# Patient Record
Sex: MALE | Race: WHITE | NOT HISPANIC OR LATINO | Employment: FULL TIME | URBAN - METROPOLITAN AREA
[De-identification: names, ages, dates, MRNs, and addresses within clinical notes are randomized per-mention and may not be internally consistent; named-entity substitution may affect disease eponyms.]

---

## 2023-02-17 PROBLEM — R05.9 COUGH: Status: ACTIVE | Noted: 2023-02-17

## 2023-02-17 PROBLEM — F41.9 ANXIETY DISORDER: Status: ACTIVE | Noted: 2023-02-17

## 2023-02-17 PROBLEM — Z20.822 EXPOSURE TO SEVERE ACUTE RESPIRATORY SYNDROME CORONAVIRUS 2 (SARS-COV-2): Status: ACTIVE | Noted: 2023-02-17

## 2023-02-17 PROBLEM — L05.91 PILONIDAL CYST: Status: ACTIVE | Noted: 2023-02-17

## 2023-02-17 PROBLEM — M54.50 LOW BACK PAIN: Status: ACTIVE | Noted: 2023-02-17

## 2023-02-17 PROBLEM — F32.A MODERATELY SEVERE DEPRESSION: Status: ACTIVE | Noted: 2023-02-17

## 2023-02-17 PROBLEM — R51.9 HEADACHE: Status: ACTIVE | Noted: 2023-02-17

## 2023-02-17 PROBLEM — R09.81 NASAL CONGESTION: Status: ACTIVE | Noted: 2023-02-17

## 2023-02-17 PROBLEM — F32.2 DEPRESSION, MAJOR, SINGLE EPISODE, SEVERE (MULTI): Status: ACTIVE | Noted: 2023-02-17

## 2023-02-17 PROBLEM — J11.1 INFLUENZA: Status: ACTIVE | Noted: 2023-02-17

## 2023-02-17 PROBLEM — M25.532 PAIN IN BOTH WRISTS: Status: ACTIVE | Noted: 2023-02-17

## 2023-02-17 PROBLEM — M25.531 PAIN IN BOTH WRISTS: Status: ACTIVE | Noted: 2023-02-17

## 2023-02-17 PROBLEM — K62.5 RECTAL BLEEDING: Status: ACTIVE | Noted: 2023-02-17

## 2023-02-17 PROBLEM — R06.02 SHORTNESS OF BREATH: Status: ACTIVE | Noted: 2023-02-17

## 2023-02-17 PROBLEM — M54.12 CERVICAL RADICULOPATHY: Status: ACTIVE | Noted: 2023-02-17

## 2023-02-17 PROBLEM — S39.012A STRAIN OF LUMBAR REGION: Status: ACTIVE | Noted: 2023-02-17

## 2023-02-17 PROBLEM — J98.01 BRONCHOSPASM: Status: ACTIVE | Noted: 2023-02-17

## 2023-02-17 RX ORDER — SERTRALINE HYDROCHLORIDE 100 MG/1
1 TABLET, FILM COATED ORAL DAILY
COMMUNITY
Start: 2022-03-29 | End: 2023-04-06 | Stop reason: ALTCHOICE

## 2023-04-06 ENCOUNTER — OFFICE VISIT (OUTPATIENT)
Dept: PRIMARY CARE | Facility: CLINIC | Age: 43
End: 2023-04-06
Payer: COMMERCIAL

## 2023-04-06 VITALS
WEIGHT: 205 LBS | DIASTOLIC BLOOD PRESSURE: 66 MMHG | BODY MASS INDEX: 28.7 KG/M2 | TEMPERATURE: 97.4 F | OXYGEN SATURATION: 93 % | HEIGHT: 71 IN | HEART RATE: 64 BPM | SYSTOLIC BLOOD PRESSURE: 105 MMHG

## 2023-04-06 DIAGNOSIS — F41.9 ANXIETY: Primary | ICD-10-CM

## 2023-04-06 PROCEDURE — 1036F TOBACCO NON-USER: CPT | Performed by: INTERNAL MEDICINE

## 2023-04-06 PROCEDURE — 99213 OFFICE O/P EST LOW 20 MIN: CPT | Performed by: INTERNAL MEDICINE

## 2023-04-06 RX ORDER — ESCITALOPRAM OXALATE 20 MG/1
1 TABLET ORAL DAILY
COMMUNITY
Start: 2023-04-03 | End: 2023-04-06 | Stop reason: SINTOL

## 2023-04-06 RX ORDER — ESCITALOPRAM OXALATE 10 MG/1
TABLET ORAL
Qty: 45 TABLET | Refills: 5 | Status: SHIPPED | OUTPATIENT
Start: 2023-04-06 | End: 2023-06-21 | Stop reason: ALTCHOICE

## 2023-04-06 RX ORDER — MULTIVITAMIN
1 TABLET ORAL
COMMUNITY

## 2023-04-06 NOTE — PROGRESS NOTES
"The patient is here for:   Chief Complaint   Patient presents with    Med Refill        I have reviewed existing histories, notes, past medical history, surgical history, social history, family history, med list, allergy list, and immunization, and updated if applicable.    Patient's PCP is: Marcial Olmstead MD    HPI:     3 months ago, lexapro was started, 10mg per day, after two weeks increased to 20mg. He finds it working well for anxiety but too sedating. Not tired, just wants to sleep more.    Additional concerns and ROS:  He is talking to therapist, and helpful.    Physical exam:  /66   Pulse 64   Temp 36.3 °C (97.4 °F)   Ht 1.803 m (5' 11\")   Wt 93 kg (205 lb)   SpO2 93%   BMI 28.59 kg/m²    Mood affection appropriate    Assessments/orders:   1. Anxiety   Controlled but 20mg lexapro too sedating for him. Decrease to 15mg. Follow up 6 months, unless it is working out, come back earlier.    - escitalopram (Lexapro) 10 mg tablet; Take one and half tab per day  Dispense: 45 tablet; Refill: 5                    "

## 2023-06-21 ENCOUNTER — OFFICE VISIT (OUTPATIENT)
Dept: PRIMARY CARE | Facility: CLINIC | Age: 43
End: 2023-06-21
Payer: COMMERCIAL

## 2023-06-21 VITALS
BODY MASS INDEX: 29.96 KG/M2 | HEART RATE: 73 BPM | OXYGEN SATURATION: 94 % | SYSTOLIC BLOOD PRESSURE: 104 MMHG | TEMPERATURE: 98.2 F | WEIGHT: 214 LBS | DIASTOLIC BLOOD PRESSURE: 66 MMHG | HEIGHT: 71 IN

## 2023-06-21 DIAGNOSIS — F41.9 ANXIETY DISORDER, UNSPECIFIED TYPE: ICD-10-CM

## 2023-06-21 DIAGNOSIS — Z13.39 ADHD (ATTENTION DEFICIT HYPERACTIVITY DISORDER) EVALUATION: Primary | ICD-10-CM

## 2023-06-21 PROCEDURE — 99213 OFFICE O/P EST LOW 20 MIN: CPT | Performed by: INTERNAL MEDICINE

## 2023-06-21 PROCEDURE — 1036F TOBACCO NON-USER: CPT | Performed by: INTERNAL MEDICINE

## 2023-06-21 NOTE — PROGRESS NOTES
" The patient is here for:   Chief Complaint   Patient presents with    Anxiety        I have reviewed existing histories, notes, past medical history, surgical history, social history, family history, med list, allergy list, and immunization, and updated if applicable.  PCP: Marcial Olmstead MD    HPI:   For Follow up. Had been taking lexapro for anxiety. He recently stopped taking when he run out, because he felt that it made him drowsy and not able to get up easily in the morning.  He is feeling better this way.  His anxiety is actually not bad, mild at most, but what really liked is to be able to test for ADHD. He read up on ADHD, and thought his symptoms fit that dx.   He does not necessary want medication treat for it but is open to it, mostly would like an assessment.    No other problems  Got back with wife, and they are trying to work out the issues.  Had labs earlier this year    Lab Results   Component Value Date    WBC 6.4 01/12/2023    HGB 15.7 01/12/2023    HCT 46.7 01/12/2023     01/12/2023    CHOL 115 01/12/2023    TRIG 57 01/12/2023    HDL 47.5 01/12/2023    LDLDIRECT 95 09/06/2018    ALT 40 01/12/2023    AST 22 01/12/2023     01/12/2023    K 5.1 01/12/2023     01/12/2023    CREATININE 1.15 01/12/2023    BUN 15 01/12/2023    CO2 30 01/12/2023    HGBA1C 5.5 01/12/2023    Denied depression     Physical exam:  /66   Pulse 73   Temp 36.8 °C (98.2 °F)   Ht 1.81 m (5' 11.25\")   Wt 97.1 kg (214 lb)   SpO2 94%   BMI 29.64 kg/m²     Mood and affection appropriate    Assessments/orders:   1. ADHD (attention deficit hyperactivity disorder) evaluation     - Referral to Psychology; Future    2. Anxiety disorder, unspecified type     - Referral to Psychology; Future                "

## 2023-10-09 ENCOUNTER — APPOINTMENT (OUTPATIENT)
Dept: PRIMARY CARE | Facility: CLINIC | Age: 43
End: 2023-10-09

## 2024-02-28 ENCOUNTER — OFFICE VISIT (OUTPATIENT)
Dept: PRIMARY CARE | Facility: CLINIC | Age: 44
End: 2024-02-28
Payer: COMMERCIAL

## 2024-02-28 ENCOUNTER — HOSPITAL ENCOUNTER (OUTPATIENT)
Dept: RADIOLOGY | Facility: CLINIC | Age: 44
Discharge: HOME | End: 2024-02-28
Payer: COMMERCIAL

## 2024-02-28 VITALS
DIASTOLIC BLOOD PRESSURE: 78 MMHG | HEART RATE: 81 BPM | TEMPERATURE: 97.8 F | WEIGHT: 213 LBS | HEIGHT: 70 IN | RESPIRATION RATE: 18 BRPM | BODY MASS INDEX: 30.49 KG/M2 | OXYGEN SATURATION: 95 % | SYSTOLIC BLOOD PRESSURE: 118 MMHG

## 2024-02-28 DIAGNOSIS — Z87.81 HISTORY OF THUMB FRACTURE: ICD-10-CM

## 2024-02-28 DIAGNOSIS — M79.644 PAIN OF RIGHT THUMB: ICD-10-CM

## 2024-02-28 DIAGNOSIS — M79.644 PAIN OF RIGHT THUMB: Primary | ICD-10-CM

## 2024-02-28 PROCEDURE — 1036F TOBACCO NON-USER: CPT | Performed by: INTERNAL MEDICINE

## 2024-02-28 PROCEDURE — 73130 X-RAY EXAM OF HAND: CPT | Mod: RIGHT SIDE | Performed by: RADIOLOGY

## 2024-02-28 PROCEDURE — 99213 OFFICE O/P EST LOW 20 MIN: CPT | Performed by: INTERNAL MEDICINE

## 2024-02-28 PROCEDURE — 73130 X-RAY EXAM OF HAND: CPT | Mod: RT

## 2024-02-28 RX ORDER — QUETIAPINE FUMARATE 25 MG/1
TABLET, FILM COATED ORAL
COMMUNITY
Start: 2024-02-19

## 2024-02-28 RX ORDER — BUPROPION HYDROCHLORIDE 150 MG/1
450 TABLET, FILM COATED, EXTENDED RELEASE ORAL DAILY
COMMUNITY
Start: 2024-02-16

## 2024-02-28 RX ORDER — HYDROXYZINE PAMOATE 25 MG/1
CAPSULE ORAL
COMMUNITY
Start: 2024-01-24

## 2024-02-28 RX ORDER — TRAZODONE HYDROCHLORIDE 50 MG/1
TABLET ORAL
COMMUNITY
Start: 2024-02-17

## 2024-02-28 ASSESSMENT — PATIENT HEALTH QUESTIONNAIRE - PHQ9
10. IF YOU CHECKED OFF ANY PROBLEMS, HOW DIFFICULT HAVE THESE PROBLEMS MADE IT FOR YOU TO DO YOUR WORK, TAKE CARE OF THINGS AT HOME, OR GET ALONG WITH OTHER PEOPLE: NOT DIFFICULT AT ALL
SUM OF ALL RESPONSES TO PHQ9 QUESTIONS 1 AND 2: 0
2. FEELING DOWN, DEPRESSED OR HOPELESS: SEVERAL DAYS
1. LITTLE INTEREST OR PLEASURE IN DOING THINGS: NOT AT ALL
2. FEELING DOWN, DEPRESSED OR HOPELESS: NOT AT ALL
1. LITTLE INTEREST OR PLEASURE IN DOING THINGS: SEVERAL DAYS
SUM OF ALL RESPONSES TO PHQ9 QUESTIONS 1 AND 2: 2

## 2024-02-28 NOTE — PROGRESS NOTES
"PCP: Marcial Olmstead MD   I have reviewed existing histories, notes, past medical history, surgical history, social history, family history, med list, allergy list, and immunization, and updated.    HPI:   Complains of  right thumb pain since November. No known recent injury prior to onset. Had fracture at this same area over 20 years ago, and had pins placed, which came out themselves, and had not problem till recently.      Lab Results   Component Value Date    WBC 6.4 01/12/2023    HGB 15.7 01/12/2023    HCT 46.7 01/12/2023     01/12/2023    CHOL 115 01/12/2023    TRIG 57 01/12/2023    HDL 47.5 01/12/2023    LDLDIRECT 95 09/06/2018    ALT 40 01/12/2023    AST 22 01/12/2023     01/12/2023    K 5.1 01/12/2023     01/12/2023    CREATININE 1.15 01/12/2023    BUN 15 01/12/2023    CO2 30 01/12/2023    HGBA1C 5.5 01/12/2023     Physical exam:  /78   Pulse 81   Temp 36.6 °C (97.8 °F)   Resp 18   Ht 1.778 m (5' 10\")   Wt 96.6 kg (213 lb)   SpO2 95%   BMI 30.56 kg/m²    right hand pulse is normal, no edema.   No focal neurologic deficit  There is tenderness at CMC joint.   Wrist joint is normal     Assessments/orders:   1. Pain of right thumb  XR hand right 3+ views, Referral to Orthopaedic Surgery      2. History of thumb fracture  XR hand right 3+ views, Referral to Orthopaedic Surgery                        "

## 2025-03-24 ENCOUNTER — APPOINTMENT (OUTPATIENT)
Dept: PRIMARY CARE | Facility: CLINIC | Age: 45
End: 2025-03-24
Payer: COMMERCIAL